# Patient Record
Sex: FEMALE | URBAN - METROPOLITAN AREA
[De-identification: names, ages, dates, MRNs, and addresses within clinical notes are randomized per-mention and may not be internally consistent; named-entity substitution may affect disease eponyms.]

---

## 2018-05-08 ENCOUNTER — HOSPITAL ENCOUNTER (INPATIENT)
Facility: HOSPITAL | Age: 23
LOS: 1 days | Discharge: LEFT BEFORE DISCHARGE | DRG: 816 | End: 2018-05-10
Attending: EMERGENCY MEDICINE | Admitting: INTERNAL MEDICINE
Payer: COMMERCIAL

## 2018-05-08 ENCOUNTER — APPOINTMENT (EMERGENCY)
Dept: CT IMAGING | Facility: HOSPITAL | Age: 23
DRG: 816 | End: 2018-05-08
Payer: COMMERCIAL

## 2018-05-08 ENCOUNTER — APPOINTMENT (EMERGENCY)
Dept: RADIOLOGY | Facility: HOSPITAL | Age: 23
DRG: 816 | End: 2018-05-08
Payer: COMMERCIAL

## 2018-05-08 DIAGNOSIS — F10.929 ACUTE ALCOHOL INTOXICATION (HCC): ICD-10-CM

## 2018-05-08 DIAGNOSIS — R41.82 ALTERED MENTAL STATUS: Primary | ICD-10-CM

## 2018-05-08 PROBLEM — E87.6 HYPOKALEMIA: Status: ACTIVE | Noted: 2018-05-08

## 2018-05-08 PROBLEM — R03.0 ELEVATED BLOOD PRESSURE READING: Status: ACTIVE | Noted: 2018-05-08

## 2018-05-08 PROBLEM — F19.10 POLYSUBSTANCE ABUSE (HCC): Status: ACTIVE | Noted: 2018-05-08

## 2018-05-08 PROBLEM — G93.40 ENCEPHALOPATHY ACUTE: Status: ACTIVE | Noted: 2018-05-08

## 2018-05-08 PROBLEM — I49.3 PVC (PREMATURE VENTRICULAR CONTRACTION): Status: ACTIVE | Noted: 2018-05-08

## 2018-05-08 LAB
ALBUMIN SERPL BCP-MCNC: 3.5 G/DL (ref 3.5–5)
ALP SERPL-CCNC: 96 U/L (ref 46–116)
ALT SERPL W P-5'-P-CCNC: 23 U/L (ref 12–78)
AMPHETAMINES SERPL QL SCN: NEGATIVE
ANION GAP SERPL CALCULATED.3IONS-SCNC: 12 MMOL/L (ref 4–13)
APAP SERPL-MCNC: <2 UG/ML (ref 10–30)
APTT PPP: 32 SECONDS (ref 23–35)
AST SERPL W P-5'-P-CCNC: 25 U/L (ref 5–45)
ATRIAL RATE: 104 BPM
BARBITURATES UR QL: NEGATIVE
BASOPHILS # BLD AUTO: 0.04 THOUSANDS/ΜL (ref 0–0.1)
BASOPHILS NFR BLD AUTO: 1 % (ref 0–1)
BENZODIAZ UR QL: POSITIVE
BILIRUB SERPL-MCNC: 0.2 MG/DL (ref 0.2–1)
BILIRUB UR QL STRIP: NEGATIVE
BUN SERPL-MCNC: 13 MG/DL (ref 5–25)
CALCIUM SERPL-MCNC: 8.7 MG/DL (ref 8.3–10.1)
CHLORIDE SERPL-SCNC: 101 MMOL/L (ref 100–108)
CLARITY UR: CLEAR
CO2 SERPL-SCNC: 26 MMOL/L (ref 21–32)
COCAINE UR QL: POSITIVE
COLOR UR: YELLOW
COLOR, POC: YELLOW
CREAT SERPL-MCNC: 1.2 MG/DL (ref 0.6–1.3)
EOSINOPHIL # BLD AUTO: 0.06 THOUSAND/ΜL (ref 0–0.61)
EOSINOPHIL NFR BLD AUTO: 1 % (ref 0–6)
ERYTHROCYTE [DISTWIDTH] IN BLOOD BY AUTOMATED COUNT: 13.6 % (ref 11.6–15.1)
ETHANOL SERPL-MCNC: 161 MG/DL (ref 0–3)
EXT PREG TEST URINE: NEGATIVE
GFR SERPL CREATININE-BSD FRML MDRD: 26 ML/MIN/1.73SQ M
GLUCOSE SERPL-MCNC: 79 MG/DL (ref 65–140)
GLUCOSE SERPL-MCNC: 87 MG/DL (ref 65–140)
GLUCOSE UR STRIP-MCNC: NEGATIVE MG/DL
HCT VFR BLD AUTO: 37.8 % (ref 34.8–46.1)
HGB BLD-MCNC: 12.5 G/DL (ref 11.5–15.4)
HGB UR QL STRIP.AUTO: NEGATIVE
INR PPP: 0.98 (ref 0.86–1.16)
KETONES UR STRIP-MCNC: NEGATIVE MG/DL
LEUKOCYTE ESTERASE UR QL STRIP: NEGATIVE
LYMPHOCYTES # BLD AUTO: 2.78 THOUSANDS/ΜL (ref 0.6–4.47)
LYMPHOCYTES NFR BLD AUTO: 39 % (ref 14–44)
MCH RBC QN AUTO: 29.9 PG (ref 26.8–34.3)
MCHC RBC AUTO-ENTMCNC: 33.1 G/DL (ref 31.4–37.4)
MCV RBC AUTO: 90 FL (ref 82–98)
METHADONE UR QL: NEGATIVE
MONOCYTES # BLD AUTO: 0.55 THOUSAND/ΜL (ref 0.17–1.22)
MONOCYTES NFR BLD AUTO: 8 % (ref 4–12)
NEUTROPHILS # BLD AUTO: 3.68 THOUSANDS/ΜL (ref 1.85–7.62)
NEUTS SEG NFR BLD AUTO: 51 % (ref 43–75)
NITRITE UR QL STRIP: NEGATIVE
NRBC BLD AUTO-RTO: 0 /100 WBCS
OPIATES UR QL SCN: POSITIVE
P AXIS: 39 DEGREES
PCP UR QL: POSITIVE
PH UR STRIP.AUTO: 5.5 [PH] (ref 4.5–8)
PLATELET # BLD AUTO: 408 THOUSANDS/UL (ref 149–390)
PMV BLD AUTO: 10.1 FL (ref 8.9–12.7)
POTASSIUM SERPL-SCNC: 3.4 MMOL/L (ref 3.5–5.3)
PR INTERVAL: 122 MS
PROT SERPL-MCNC: 8.1 G/DL (ref 6.4–8.2)
PROT UR STRIP-MCNC: NEGATIVE MG/DL
PROTHROMBIN TIME: 13 SECONDS (ref 12.1–14.4)
QRS AXIS: 55 DEGREES
QRSD INTERVAL: 104 MS
QT INTERVAL: 350 MS
QTC INTERVAL: 460 MS
RBC # BLD AUTO: 4.18 MILLION/UL (ref 3.81–5.12)
SALICYLATES SERPL-MCNC: <3 MG/DL (ref 3–20)
SODIUM SERPL-SCNC: 139 MMOL/L (ref 136–145)
SP GR UR STRIP.AUTO: <=1.005 (ref 1–1.03)
T WAVE AXIS: 30 DEGREES
THC UR QL: POSITIVE
TROPONIN I SERPL-MCNC: <0.02 NG/ML
UROBILINOGEN UR QL STRIP.AUTO: 0.2 E.U./DL
VENTRICULAR RATE: 104 BPM
WBC # BLD AUTO: 7.11 THOUSAND/UL (ref 4.31–10.16)

## 2018-05-08 PROCEDURE — 93010 ELECTROCARDIOGRAM REPORT: CPT | Performed by: INTERNAL MEDICINE

## 2018-05-08 PROCEDURE — 36415 COLL VENOUS BLD VENIPUNCTURE: CPT | Performed by: EMERGENCY MEDICINE

## 2018-05-08 PROCEDURE — 70450 CT HEAD/BRAIN W/O DYE: CPT

## 2018-05-08 PROCEDURE — 80329 ANALGESICS NON-OPIOID 1 OR 2: CPT | Performed by: EMERGENCY MEDICINE

## 2018-05-08 PROCEDURE — 71045 X-RAY EXAM CHEST 1 VIEW: CPT

## 2018-05-08 PROCEDURE — 85025 COMPLETE CBC W/AUTO DIFF WBC: CPT | Performed by: EMERGENCY MEDICINE

## 2018-05-08 PROCEDURE — 80307 DRUG TEST PRSMV CHEM ANLYZR: CPT | Performed by: EMERGENCY MEDICINE

## 2018-05-08 PROCEDURE — 81003 URINALYSIS AUTO W/O SCOPE: CPT

## 2018-05-08 PROCEDURE — 99285 EMERGENCY DEPT VISIT HI MDM: CPT

## 2018-05-08 PROCEDURE — 99220 PR INITIAL OBSERVATION CARE/DAY 70 MINUTES: CPT | Performed by: INTERNAL MEDICINE

## 2018-05-08 PROCEDURE — 81002 URINALYSIS NONAUTO W/O SCOPE: CPT | Performed by: EMERGENCY MEDICINE

## 2018-05-08 PROCEDURE — 80053 COMPREHEN METABOLIC PANEL: CPT | Performed by: EMERGENCY MEDICINE

## 2018-05-08 PROCEDURE — 85610 PROTHROMBIN TIME: CPT | Performed by: EMERGENCY MEDICINE

## 2018-05-08 PROCEDURE — 85730 THROMBOPLASTIN TIME PARTIAL: CPT | Performed by: EMERGENCY MEDICINE

## 2018-05-08 PROCEDURE — 93005 ELECTROCARDIOGRAM TRACING: CPT

## 2018-05-08 PROCEDURE — 84484 ASSAY OF TROPONIN QUANT: CPT | Performed by: EMERGENCY MEDICINE

## 2018-05-08 PROCEDURE — 96360 HYDRATION IV INFUSION INIT: CPT

## 2018-05-08 PROCEDURE — 96372 THER/PROPH/DIAG INJ SC/IM: CPT

## 2018-05-08 PROCEDURE — 80320 DRUG SCREEN QUANTALCOHOLS: CPT | Performed by: EMERGENCY MEDICINE

## 2018-05-08 PROCEDURE — 81025 URINE PREGNANCY TEST: CPT | Performed by: EMERGENCY MEDICINE

## 2018-05-08 PROCEDURE — 82948 REAGENT STRIP/BLOOD GLUCOSE: CPT

## 2018-05-08 RX ORDER — ZIPRASIDONE MESYLATE 20 MG/ML
INJECTION, POWDER, LYOPHILIZED, FOR SOLUTION INTRAMUSCULAR
Status: DISPENSED
Start: 2018-05-08 | End: 2018-05-09

## 2018-05-08 RX ORDER — MIDAZOLAM HYDROCHLORIDE 1 MG/ML
2 INJECTION INTRAMUSCULAR; INTRAVENOUS ONCE
Status: COMPLETED | OUTPATIENT
Start: 2018-05-08 | End: 2018-05-08

## 2018-05-08 RX ORDER — MIDAZOLAM HYDROCHLORIDE 1 MG/ML
4 INJECTION INTRAMUSCULAR; INTRAVENOUS ONCE
Status: DISCONTINUED | OUTPATIENT
Start: 2018-05-08 | End: 2018-05-08

## 2018-05-08 RX ORDER — MIDAZOLAM HYDROCHLORIDE 1 MG/ML
INJECTION INTRAMUSCULAR; INTRAVENOUS
Status: COMPLETED
Start: 2018-05-08 | End: 2018-05-08

## 2018-05-08 RX ORDER — ZIPRASIDONE MESYLATE 20 MG/ML
20 INJECTION, POWDER, LYOPHILIZED, FOR SOLUTION INTRAMUSCULAR ONCE
Status: COMPLETED | OUTPATIENT
Start: 2018-05-08 | End: 2018-05-08

## 2018-05-08 RX ADMIN — MIDAZOLAM 2 MG: 1 INJECTION INTRAMUSCULAR; INTRAVENOUS at 16:05

## 2018-05-08 RX ADMIN — WATER 10 ML: 1 INJECTION INTRAMUSCULAR; INTRAVENOUS; SUBCUTANEOUS at 16:08

## 2018-05-08 RX ADMIN — ZIPRASIDONE MESYLATE 20 MG: 20 INJECTION, POWDER, LYOPHILIZED, FOR SOLUTION INTRAMUSCULAR at 16:08

## 2018-05-08 RX ADMIN — MIDAZOLAM HYDROCHLORIDE 2 MG: 1 INJECTION INTRAMUSCULAR; INTRAVENOUS at 16:05

## 2018-05-08 RX ADMIN — MIDAZOLAM 2 MG: 1 INJECTION INTRAMUSCULAR; INTRAVENOUS at 16:18

## 2018-05-08 RX ADMIN — SODIUM CHLORIDE 1000 ML: 0.9 INJECTION, SOLUTION INTRAVENOUS at 16:45

## 2018-05-08 NOTE — ED NOTES
VO received for 2mg Versed R anterior thigh by Dr Sebastian Green, given at this time       Janessa Unger RN  05/08/18 5736

## 2018-05-08 NOTE — ED ATTENDING ATTESTATION
Kane Duke MD, saw and evaluated the patient  I have discussed the patient with the resident/non-physician practitioner and agree with the resident's/non-physician practitioner's findings, Plan of Care, and MDM as documented in the resident's/non-physician practitioner's note, except where noted  All available labs and Radiology studies were reviewed  At this point I agree with the current assessment done in the Emergency Department  I have conducted an independent evaluation of this patient a history and physical is as follows: Rosario Toribio adult female was found next to drug paraphernalia with altered mental status  Per EMS the patient was minimally responsive until they started an intravenous line  At that point the patient became agitated and verbally abusive to EMS  History is limited secondary to patient's agitation although she appears to admit to smoking synthetic marijuana  She was apparently on probation for at one point but no longer is  Otherwise the patient is either unable or unwilling to provide any history of present illness  Physical exam: GCS 14 (confusion) the patient was highly agitated and physically aggressive and unable to cooperate with a detailed neurologic exam   Head appears atraumatic  Sclerae nonicteric, conjunctiva appear normal  PERRL, moist mucous membranes  Heart is tachycardic but regular  Lungs are clear to auscultation bilaterally  The abdomen is soft and nondistended  Extremities are without edema or ecchymoses  Impression plan: Altered mental status, likely secondary to drug use  Unfortunately history is limited  Patient required IM sedatives in order to properly assess her  Will perform a broad workup including EKG, laboratory studies, and head CT  Will reassess for disposition      Critical Care Time  The patient presented with a condition in which there was a high probability of imminent or life-threatening deterioration, and critical care services (excluding separately billable procedures) totalled 30-74 minutes          Procedures

## 2018-05-08 NOTE — ED NOTES
20mg Geodon VO received from Dr Jayme Kay, given at this time by ROBERT Rubio RN L anterior thigh        Tano Garza RN  05/08/18 9343

## 2018-05-08 NOTE — ED NOTES
Pt soiled linens  Pt assisted onto bedpan, able to void large amount of urine        Cayla Baron RN  05/08/18 4226

## 2018-05-08 NOTE — ED NOTES
Violent restraints applied at this time due to pt uncooperative, violent with staff, unwilling to follow verbal direction        Mariana Hughes RN  05/08/18 4505

## 2018-05-08 NOTE — ED NOTES
Correction, pt provided urine sample via bed pan and did not need to be straight cathed       Abeba Jung RN  05/08/18 0158

## 2018-05-09 VITALS
SYSTOLIC BLOOD PRESSURE: 167 MMHG | RESPIRATION RATE: 18 BRPM | TEMPERATURE: 97.6 F | OXYGEN SATURATION: 98 % | HEART RATE: 60 BPM | WEIGHT: 192.24 LBS | DIASTOLIC BLOOD PRESSURE: 70 MMHG

## 2018-05-09 LAB
ALBUMIN SERPL BCP-MCNC: 3.2 G/DL (ref 3.5–5)
ALP SERPL-CCNC: 93 U/L (ref 46–116)
ALT SERPL W P-5'-P-CCNC: 22 U/L (ref 12–78)
ANION GAP SERPL CALCULATED.3IONS-SCNC: 10 MMOL/L (ref 4–13)
AST SERPL W P-5'-P-CCNC: 22 U/L (ref 5–45)
BILIRUB SERPL-MCNC: 0.37 MG/DL (ref 0.2–1)
BUN SERPL-MCNC: 9 MG/DL (ref 5–25)
CALCIUM SERPL-MCNC: 9 MG/DL (ref 8.3–10.1)
CHLORIDE SERPL-SCNC: 103 MMOL/L (ref 100–108)
CO2 SERPL-SCNC: 22 MMOL/L (ref 21–32)
CREAT SERPL-MCNC: 0.91 MG/DL (ref 0.6–1.3)
ERYTHROCYTE [DISTWIDTH] IN BLOOD BY AUTOMATED COUNT: 13.6 % (ref 11.6–15.1)
GFR SERPL CREATININE-BSD FRML MDRD: 37 ML/MIN/1.73SQ M
GLUCOSE P FAST SERPL-MCNC: 98 MG/DL (ref 65–99)
GLUCOSE SERPL-MCNC: 98 MG/DL (ref 65–140)
HCT VFR BLD AUTO: 40.4 % (ref 34.8–46.1)
HGB BLD-MCNC: 13.4 G/DL (ref 11.5–15.4)
MAGNESIUM SERPL-MCNC: 2.1 MG/DL (ref 1.6–2.6)
MCH RBC QN AUTO: 30.1 PG (ref 26.8–34.3)
MCHC RBC AUTO-ENTMCNC: 33.2 G/DL (ref 31.4–37.4)
MCV RBC AUTO: 91 FL (ref 82–98)
PHOSPHATE SERPL-MCNC: 3.3 MG/DL (ref 2.3–4.1)
PLATELET # BLD AUTO: 410 THOUSANDS/UL (ref 149–390)
PMV BLD AUTO: 10.2 FL (ref 8.9–12.7)
POTASSIUM SERPL-SCNC: 3.9 MMOL/L (ref 3.5–5.3)
PROT SERPL-MCNC: 8.1 G/DL (ref 6.4–8.2)
RBC # BLD AUTO: 4.45 MILLION/UL (ref 3.81–5.12)
SODIUM SERPL-SCNC: 135 MMOL/L (ref 136–145)
WBC # BLD AUTO: 7.71 THOUSAND/UL (ref 4.31–10.16)

## 2018-05-09 PROCEDURE — 84100 ASSAY OF PHOSPHORUS: CPT | Performed by: INTERNAL MEDICINE

## 2018-05-09 PROCEDURE — 85027 COMPLETE CBC AUTOMATED: CPT | Performed by: INTERNAL MEDICINE

## 2018-05-09 PROCEDURE — 80053 COMPREHEN METABOLIC PANEL: CPT | Performed by: INTERNAL MEDICINE

## 2018-05-09 PROCEDURE — 83735 ASSAY OF MAGNESIUM: CPT | Performed by: INTERNAL MEDICINE

## 2018-05-09 PROCEDURE — 99232 SBSQ HOSP IP/OBS MODERATE 35: CPT | Performed by: INTERNAL MEDICINE

## 2018-05-09 RX ORDER — SODIUM CHLORIDE 9 MG/ML
100 INJECTION, SOLUTION INTRAVENOUS CONTINUOUS
Status: DISCONTINUED | OUTPATIENT
Start: 2018-05-09 | End: 2018-05-10 | Stop reason: HOSPADM

## 2018-05-09 RX ORDER — HALOPERIDOL 5 MG/ML
1 INJECTION INTRAMUSCULAR EVERY 6 HOURS PRN
Status: DISCONTINUED | OUTPATIENT
Start: 2018-05-09 | End: 2018-05-10 | Stop reason: HOSPADM

## 2018-05-09 RX ORDER — ONDANSETRON 2 MG/ML
4 INJECTION INTRAMUSCULAR; INTRAVENOUS EVERY 6 HOURS PRN
Status: DISCONTINUED | OUTPATIENT
Start: 2018-05-09 | End: 2018-05-10 | Stop reason: HOSPADM

## 2018-05-09 RX ORDER — ACETAMINOPHEN 325 MG/1
650 TABLET ORAL EVERY 6 HOURS PRN
Status: DISCONTINUED | OUTPATIENT
Start: 2018-05-09 | End: 2018-05-10 | Stop reason: HOSPADM

## 2018-05-09 RX ORDER — CHLORDIAZEPOXIDE HYDROCHLORIDE 10 MG/1
10 CAPSULE, GELATIN COATED ORAL EVERY 8 HOURS SCHEDULED
Status: DISCONTINUED | OUTPATIENT
Start: 2018-05-09 | End: 2018-05-10 | Stop reason: HOSPADM

## 2018-05-09 RX ORDER — DEXTROSE, SODIUM CHLORIDE, AND POTASSIUM CHLORIDE 5; .45; .15 G/100ML; G/100ML; G/100ML
100 INJECTION INTRAVENOUS CONTINUOUS
Status: DISCONTINUED | OUTPATIENT
Start: 2018-05-09 | End: 2018-05-09

## 2018-05-09 RX ORDER — LORAZEPAM 2 MG/ML
1 INJECTION INTRAMUSCULAR EVERY 6 HOURS PRN
Status: DISCONTINUED | OUTPATIENT
Start: 2018-05-09 | End: 2018-05-10 | Stop reason: HOSPADM

## 2018-05-09 RX ORDER — METOPROLOL TARTRATE 5 MG/5ML
5 INJECTION INTRAVENOUS EVERY 6 HOURS PRN
Status: DISCONTINUED | OUTPATIENT
Start: 2018-05-09 | End: 2018-05-10 | Stop reason: HOSPADM

## 2018-05-09 RX ADMIN — CLONIDINE 0.1 MG: 0.1 PATCH TRANSDERMAL at 19:03

## 2018-05-09 RX ADMIN — DEXTROSE, SODIUM CHLORIDE, AND POTASSIUM CHLORIDE 100 ML/HR: 5; .45; .15 INJECTION INTRAVENOUS at 00:45

## 2018-05-09 RX ADMIN — ENOXAPARIN SODIUM 40 MG: 40 INJECTION SUBCUTANEOUS at 09:42

## 2018-05-09 RX ADMIN — ACETAMINOPHEN 650 MG: 325 TABLET, FILM COATED ORAL at 18:34

## 2018-05-09 RX ADMIN — SODIUM CHLORIDE 100 ML/HR: 0.9 INJECTION, SOLUTION INTRAVENOUS at 17:43

## 2018-05-09 RX ADMIN — CHLORDIAZEPOXIDE HYDROCHLORIDE 10 MG: 10 CAPSULE ORAL at 21:34

## 2018-05-09 RX ADMIN — SODIUM CHLORIDE 100 ML/HR: 0.9 INJECTION, SOLUTION INTRAVENOUS at 11:00

## 2018-05-09 NOTE — H&P
H&P- [employer] [place] [employer] [place] 5/8/1868, 80 y o  female MRN: [id number]    Unit/Bed#: ED 17 Encounter: [id number]    Primary Care Provider: No primary care provider on file  Date and time admitted to hospital: 5/8/2018  3:55 PM        Polysubstance abuse   Assessment & Plan    · Urine drug screen was positive for PCP, cocaine, opiates, benzodiazepines, THC and according to the ER doctor, patient also smokes K2, and was found to have an alcohol level that is high  · We do not have any information about this patient yet as patient came in agitated and was given doses of Geodon and Versed in the emergency room and presently patient is sedated  · Continue restraints in the meantime  · IV fluids  · Haloperidol p r n  Dorathy Infield · Watch out for any signs and symptoms of withdrawal from alcohol or any substance/illicit drug abuse  If patient develops alcohol withdrawal signs and symptoms, we will eventually use benzodiazepine  · Should get information from the patient once awake and coherent  · Needs counseling regarding polysubstance abuse; may need drug rehab  · Consider psychiatric consult  · Continued observation in the meantime ( 1:1)          * Encephalopathy acute   Assessment & Plan    · Toxic metabolic encephalopathy due to polysubstance ingestion/abuse  Please see details under polysubstance abuse section  · IV fluids  · Neuro checks  · Monitor electrolytes  · Control patient's agitation/violent behavior  · Continue restraints in the meantime  This has to be renewed every 4 hours  · Continued observation in the meantime ( 1:1)    · Haloperidol p r n  Dorathy Infield PVC (premature ventricular contraction)   Assessment & Plan    · Occasional PVCs  · Monitor electrolytes  Replace hypokalemia  · Telemetry  · Likely this is related to patient's polysubstance abuse and alcohol intake            Elevated blood pressure reading   Assessment & Plan    · This is likely due to patient's polysubstance abuse including alcohol intake  · IV blood pressure medication on as needed basis  · Monitor blood pressures  Hypokalemia   Assessment & Plan    · Replace potassium  · Monitor electrolytes  VTE Prophylaxis: Enoxaparin (Lovenox)  / sequential compression device it is important to note, that VTE risk assessment cannot be done at this point as patient is sedated and we cannot get any information from her yet  Needs to be assessed that tomorrow once patient is more awake  Code Status:  Since patient is sedated and we do not have any information about patient's family or even her name, by default, we put patient on level 1, full code for now  POLST: POLST form is not discussed and not completed at this time  Discussion with family:  We do not have any information about this patient yet at this point  We do not even know her name  Anticipated Length of Stay:  Patient will be admitted on an Observation basis with an anticipated length of stay of  less than 2 midnights  Justification for Hospital Stay:  Above findings and plans  Total Time for Visit, including Counseling / Coordination of Care: 1 hour  Greater than 50% of this total time spent on direct patient counseling and coordination of care  Chief Complaint:  Found unconscious    History of Present Illness:    Cassandra Tru[place] [employer] [place] is a 80 y o  female who was found unconscious on a ramp and was brought by EMS to [facility], emergency room  At this point in time, the do not have any information about this patient, not even her name, or any family members  Presently, patient is sedated and was given Versed and Geodon, due to an episode of significant agitation awhile ago  Thus, my HPI will be based on the sign out to me by the emergency room physician, as well as the emergency room doctor's notes  Patient came in due to an evaluation for Auchter mental status    Patient was found passed out on the ground with a pipe next to her  According to the ER notes, initial evaluation found that she was unresponsive, snoring, with respirations and that after IV access was obtained, patient woke up  However, patient developed significant delirium and agitation in the emergency room  Patient was creamy at the staff and not answering questions appropriately  Patient also did not follow commands and was aggressive with staff  Thus patient was placed on limb restraints and even required security to control her agitation  Patient then was given IV Versed and I am Geodon for chemical sedation  According to the ER notes, patient mentioned that she was drinking alcohol and smoking K2, though she did not answer any other questions  She mentioned that I am fine and I am not on probation    Vital signs on admission revealed that patient had elevated blood pressure with a systolic 470 and oxygen saturation at 90% on room air  Following sedation, patient was resting comfortably with pupils equal round and reactive with no focal neurologic deficits  GCS at that time was 14 prior to medication administration and was moving all extremities equally with full muscle strength  They did not find any nystagmus or any clonus or rigidity  Presently, when I saw the patient, patient is on limb restraints, still sedated  No response to questions or commands at this point  Review of Systems:    Review of Systems   Cannot do review of systems at this point as patient is presently sedated  Thus please see HPI  We will eventually to the review of systems once patient is more awake, likely tomorrow morning  Past Medical and Surgical History: We have no information yet about it this patient regarding her past medical and surgical history  Thus this will be done likely tomorrow, hopefully, patient is more awake to answer questions at that time        Meds/Allergies:    Medications and allergies cannot be determine yet at this point as patient is presently sedated  Thus this needs to be done when patient is more awake, hopefully by tomorrow  For the medication list, we do not have any information yet as we do not have any information about the patient yet at this point and patient is presently sedated  Thus this will be done once patient is awake and conversant and coherent  Allergies:  Please see above notes  Social History:     Patient is presently sedated and we do not have any information about this patient yet at this point  However, patient likely has polysubstance abuse  Family History:    We do not have any information yet about this patient including family health history  Physical Exam:     Vitals:   Blood Pressure: 155/87 (05/08/18 1953)  Pulse: 87 (05/08/18 1953)  Temperature: 98 2 °F (36 8 °C) (05/08/18 2034)  Temp Source: Temporal (05/08/18 2034)  Respirations: 16 (05/08/18 1953)  Weight - Scale: 87 2 kg (192 lb 3 9 oz) (05/08/18 1621)  SpO2: 100 % (05/08/18 1953)    Physical Exam   Constitutional:   Patient is presently sedated  With limb restraints on patient's right arm and left leg  HENT:   Head: Normocephalic and atraumatic  Eyes: Conjunctivae are normal  Pupils are equal, round, and reactive to light  Right eye exhibits no discharge  Left eye exhibits no discharge  No scleral icterus  Neck: Neck supple  No JVD present  No tracheal deviation present  Cardiovascular: Normal rate and regular rhythm  Exam reveals no gallop and no friction rub  No murmur heard  Regular rhythm with occasional premature beats  Pulmonary/Chest: Effort normal and breath sounds normal  No stridor  No respiratory distress  She has no wheezes  She has no rales  Abdominal: Soft  Bowel sounds are normal  She exhibits no distension  There is no tenderness  There is no rebound and no guarding  Musculoskeletal: She exhibits no edema, tenderness or deformity     Neurological:   Patient is presently sedated  Does not answer questions  Does not wake up  With normal respirations  Skin: Skin is warm  No rash noted  She is not diaphoretic  No erythema  No pallor  Positive for tattoos  Psychiatric:   Psychiatric evaluation cannot be done yet as patient is presently sedated  Additional Data:     Lab Results: I have personally reviewed pertinent reports  Results from last 7 days  Lab Units 05/08/18  1643   WBC Thousand/uL 7 11   HEMOGLOBIN g/dL 12 5   HEMATOCRIT % 37 8   PLATELETS Thousands/uL 408*   NEUTROS PCT % 51   LYMPHS PCT % 39   MONOS PCT % 8   EOS PCT % 1       Results from last 7 days  Lab Units 05/08/18  1644   SODIUM mmol/L 139   POTASSIUM mmol/L 3 4*   CHLORIDE mmol/L 101   CO2 mmol/L 26   BUN mg/dL 13   CREATININE mg/dL 1 20   CALCIUM mg/dL 8 7   TOTAL PROTEIN g/dL 8 1   BILIRUBIN TOTAL mg/dL 0 20   ALK PHOS U/L 96   ALT U/L 23   AST U/L 25   GLUCOSE RANDOM mg/dL 79       Results from last 7 days  Lab Units 05/08/18  1654   INR  0 98       Results from last 7 days  Lab Units 05/08/18  1704   POC GLUCOSE mg/dl 87           Imaging: I have personally reviewed pertinent reports  CT head without contrast   Final Result by Manuel Bourgeois MD (05/08 1820)      No acute intracranial abnormality  Workstation performed: [id number]         XR chest portable   Final Result by Leonard Burns DO (05/08 1745)      Low lung volumes  No acute cardiopulmonary disease  Workstation performed: [id number]             EKG, Pathology, and Other Studies Reviewed on Admission:   · EKG:  Sinus rhythm with occasional PVCs  Allscripts / Epic Records Reviewed: Yes     ** Please Note: This note has been constructed using a voice recognition system   **

## 2018-05-09 NOTE — PLAN OF CARE
DISCHARGE PLANNING     Discharge to home or other facility with appropriate resources Not Progressing        INFECTION - ADULT     Absence or prevention of progression during hospitalization Not Progressing     Absence of fever/infection during neutropenic period Not Progressing        Knowledge Deficit     Patient/family/caregiver demonstrates understanding of disease process, treatment plan, medications, and discharge instructions Not Progressing        PAIN - ADULT     Verbalizes/displays adequate comfort level or baseline comfort level Not Progressing        Potential for Falls     Patient will remain free of falls Not Progressing        Prexisting or High Potential for Compromised Skin Integrity     Skin integrity is maintained or improved Not Progressing        SAFETY ADULT     Maintain or return to baseline ADL function Not Progressing     Maintain or return mobility status to optimal level Not Progressing     Patient will remain free of falls Not Progressing

## 2018-05-09 NOTE — ASSESSMENT & PLAN NOTE
· This is likely due to patient's polysubstance abuse including alcohol intake  · IV blood pressure medication on as needed basis  · Monitor blood pressures

## 2018-05-09 NOTE — PROGRESS NOTES
Pt up ambulating to bathroom  Patient reports name is [place], [place] [birth year]  Pt is still sedated, and unable/refuses to answer additional questions

## 2018-05-09 NOTE — ASSESSMENT & PLAN NOTE
· Occasional PVCs  · Monitor electrolytes  Replace hypokalemia  · Telemetry  · Likely this is related to patient's polysubstance abuse and alcohol intake

## 2018-05-09 NOTE — ED NOTES
SLIM admitting provider reporting he would reorder violent restraints for pt due to inability to follow direction and interference with medical treatment        Janessa Unger RN  05/08/18 2020

## 2018-05-09 NOTE — PLAN OF CARE
Knowledge Deficit     Patient/family/caregiver demonstrates understanding of disease process, treatment plan, medications, and discharge instructions Not Progressing          Prexisting or High Potential for Compromised Skin Integrity     Skin integrity is maintained or improved Progressing        SAFETY ADULT     Maintain or return to baseline ADL function Progressing     Maintain or return mobility status to optimal level Progressing     Patient will remain free of falls Progressing

## 2018-05-09 NOTE — ASSESSMENT & PLAN NOTE
· Urine drug screen was positive for PCP, cocaine, opiates, benzodiazepines, THC and according to the ER doctor, patient also smokes K2, and was found to have an alcohol level that is high  · We do not have any information about this patient yet as patient came in agitated and was given doses of Geodon and Versed in the emergency room and presently patient is sedated  · Continue restraints in the meantime  · IV fluids  · Haloperidol p r n  Marcine Grow · Watch out for any signs and symptoms of withdrawal from alcohol or any substance/illicit drug abuse  If patient develops alcohol withdrawal signs and symptoms, we will eventually use benzodiazepine  · Should get information from the patient once awake and coherent  · Needs counseling regarding polysubstance abuse; may need drug rehab  · Consider psychiatric consult    · Continued observation in the meantime ( 1:1)

## 2018-05-09 NOTE — PROGRESS NOTES
Pt received on unit in restraints due to earlier episode of agitation in the ED  Restraints immediately removed upon arrival to this unit as pt was observed to be sedated and less agitated at this time  Pt remained disoriented and uncooperative initially refusing any assessment or interventions by this writer  Pt later consented to allow this writer to assess her but would not allow RN to assess her back or move her  Presents as restless and irritable/agitated when touched or aroused from sleep  Refused application of SCDs  Pt is a poor historian and minimally verbal with incoherent speech at times  Unable/unwilling to answer majority of admission assessment questions  Stated only that her name was "Sophia Amezcua " Will continue to monitor and endorse to day shift RN

## 2018-05-09 NOTE — ASSESSMENT & PLAN NOTE
· Toxic metabolic encephalopathy due to polysubstance ingestion/abuse  Please see details under polysubstance abuse section  · IV fluids  · Neuro checks  · Monitor electrolytes  · Control patient's agitation/violent behavior  · Continue restraints in the meantime  This has to be renewed every 4 hours  · Continued observation in the meantime ( 1:1)    · Haloperidol brittney Randolph

## 2018-05-09 NOTE — CASE MANAGEMENT
Initial Clinical Review    Admission: Date/Time/Statement:    OBS  ORDER     5/8  @   1939    IP   ORDER  ENTERED   5/9  @   1012  :  Given the patient is unable to communicate meaningfully, her identity is unknown, I do believe that the patient would require another midnight for cardiopulmonary monitoring  Will change the patient class status to inpatient  Discussed with nursing team and case management    ED: Date/Time/Mode of Arrival:   ED Arrival Information     Expected Arrival Acuity Means of Arrival Escorted By Service Admission Type    - 5/8/2018 15:52 [facility] [id number] Complaint    OVERDOSE          Chief Complaint:   Chief Complaint   Patient presents with    Overdose - Accidental     Pt found unconscious on a ramp, brought in by EMS, pt responds to painful stimuli, pt found with pipe next to her, pt snoring respirations, after IV attempt, pt uncooperative  History of Illness:   Cassandra Trujillo Two [place] is a 80 y o  female who was found unconscious on a ramp and was brought by EMS to [facility], emergency room  At this point in time, the do not have any information about this patient, not even her name, or any family members  Presently, patient is sedated and was given Versed and Geodon, due to an episode of significant agitation awhile ago  Thus, my HPI will be based on the sign out to me by the emergency room physician, as well as the emergency room doctor's notes  Patient came in due to an evaluation for Auchter mental status  Patient was found passed out on the ground with a pipe next to her  According to the ER notes, initial evaluation found that she was unresponsive, snoring, with respirations and that after IV access was obtained, patient woke up  However, patient developed significant delirium and agitation in the emergency room    Patient was creamy at the staff and not answering questions appropriately  Patient also did not follow commands and was aggressive with staff  Thus patient was placed on limb restraints and even required security to control her agitation  Patient then was given IV Versed and I am Geodon for chemical sedation  According to the ER notes, patient mentioned that she was drinking alcohol and smoking K2, though she did not answer any other questions  She mentioned that I am fine and I am not on probation    Vital signs on admission revealed that patient had elevated blood pressure with a systolic 198 and oxygen saturation at 90% on room air  Following sedation, patient was resting comfortably with pupils equal round and reactive with no focal neurologic deficits  GCS at that time was 14 prior to medication administration and was moving all extremities equally with full muscle strength  They did not find any nystagmus or any clonus or rigidity  Presently, when I saw the patient, patient is on limb restraints, still sedated    No response to questions or commands at this point        ED Vital Signs:   ED Triage Vitals   Temperature Pulse Respirations Blood Pressure SpO2   05/08/18 2034 05/08/18 1619 05/08/18 1612 05/08/18 1612 05/08/18 1612   98 2 °F (36 8 °C) (!) 120 (!) 26 158/96 98 %      Temp Source Heart Rate Source Patient Position - Orthostatic VS BP Location FiO2 (%)   05/08/18 2034 05/08/18 1619 05/08/18 1612 05/08/18 1612 --   Temporal Monitor Lying Right arm       Pain Score       05/08/18 1852       No Pain        Wt Readings from Last 1 Encounters:   05/08/18 87 2 kg (192 lb 3 9 oz)       Vital Signs (abnormal):    above    Abnormal Labs/Diagnostic Test Results:    UDS   +  benzos   +  Cocaine    +  THC    +  pCP    + opiate  K  3 4  BAL  161  Ct  Head;  No acute intracranial abnormality  CXR:   NAD    ED Treatment:   Medication Administration from 05/08/2018 1551 to 05/08/2018 2044       Date/Time Order Dose Route Action Action by Comments     05/08/2018 1608 sterile water injection **AcuDose Override Pull** 10 mL  Given Mono Pritchard RN      05/08/2018 2022 midazolam (VERSED) injection 4 mg   Intramuscular Canceled Entry Ovidio Bui RN canceled by provider     05/08/2018 1608 ziprasidone (GEODON) IM injection 20 mg 20 mg Intramuscular Given Mono Pritchard RN      05/08/2018 1605 midazolam (VERSED) injection 2 mg 2 mg Intramuscular Given Ovidio Bui RN      05/08/2018 1618 midazolam (VERSED) injection 2 mg 2 mg Intramuscular Given Aileen Palacios RN      05/08/2018 1747 sodium chloride 0 9 % bolus 1,000 mL 0 mL Intravenous Stopped Aileen Palacios RN      05/08/2018 1645 sodium chloride 0 9 % bolus 1,000 mL 1,000 mL Intravenous New Bag Aileen Palacios RN           Past Medical/Surgical History: Active Ambulatory Problems     Diagnosis Date Noted    No Active Ambulatory Problems     Resolved Ambulatory Problems     Diagnosis Date Noted    No Resolved Ambulatory Problems     No Additional Past Medical History       Admitting Diagnosis: Altered mental status [R41 82]  Acute alcohol intoxication ([facility] [address] ) [F10 929]    Age/Sex: 80 y o  female    Assessment/Plan:     Polysubstance abuse   Assessment & Plan     · Urine drug screen was positive for PCP, cocaine, opiates, benzodiazepines, THC and according to the ER doctor, patient also smokes K2, and was found to have an alcohol level that is high  · We do not have any information about this patient yet as patient came in agitated and was given doses of Geodon and Versed in the emergency room and presently patient is sedated  · Continue restraints in the meantime  · IV fluids  · Haloperidol p r n  Elsa Shah · Watch out for any signs and symptoms of withdrawal from alcohol or any substance/illicit drug abuse  If patient develops alcohol withdrawal signs and symptoms, we will eventually use benzodiazepine  · Should get information from the patient once awake and coherent    · Needs counseling regarding polysubstance abuse; may need drug rehab  · Consider psychiatric consult  · Continued observation in the meantime ( 1:1)       Encephalopathy acute   Assessment & Plan     · Toxic metabolic encephalopathy due to polysubstance ingestion/abuse  Please see details under polysubstance abuse section  · IV fluids  · Neuro checks  · Monitor electrolytes  · Control patient's agitation/violent behavior  · Continue restraints in the meantime  This has to be renewed every 4 hours  · Continued observation in the meantime ( 1:1)    · Haloperidol p r n             PVC (premature ventricular contraction)   Assessment & Plan     · Occasional PVCs  · Monitor electrolytes  Replace hypokalemia  · Telemetry  · Likely this is related to patient's polysubstance abuse and alcohol intake         Elevated blood pressure reading   Assessment & Plan     · This is likely due to patient's polysubstance abuse including alcohol intake  · IV blood pressure medication on as needed basis  · Monitor blood pressures           Hypokalemia   Assessment & Plan     · Replace potassium  · Monitor electrolytes       Anticipated Length of Stay:  Patient will be admitted on an Observation basis with an anticipated length of stay of  less than 2 midnights  Justification for Hospital Stay:  Above findings and plans          Admission Orders:   OBS  ORDER   5/8  @  1939  Scheduled Meds:   Current Facility-Administered Medications:  acetaminophen 650 mg Oral Q6H PRN Shelly Zaragoza MD    dextrose 5 % and sodium chloride 0 45 % with KCl 20 mEq/L 100 mL/hr Intravenous Continuous Shelly Zaragoza MD Last Rate: 100 mL/hr (05/09/18 0045)   enoxaparin 40 mg Subcutaneous Daily Shelly Zaragoza MD    haloperidol lactate 1 mg Intramuscular Q6H PRN Shelly Zaragoza MD    metoprolol 5 mg Intravenous Q6H PRN Shelly Zaragoza MD    ondansetron 4 mg Intravenous Q6H PRN Shelly Zaragoza MD      Continuous Infusions:   dextrose 5 % and sodium chloride 0 45 % with KCl 20 mEq/L 100 mL/hr Last Rate: 100 mL/hr (05/09/18 0045)     PRN Meds:   acetaminophen    haloperidol lactate    metoprolol    ondansetron    O2  2L  1:1  Observation  Neuro  Checks  q 4 hrs    Thank you,  [address] [facility] in the [facility] by Blas Monterroso for 2017  Network Utilization Review Department  Phone: [phone]; Fax [phone]  ATTENTION: The Network Utilization Review Department is now centralized for our 7 Facilities  Make a note that we have a new phone and fax numbers for our Department  Please call with any questions or concerns to [phone] and carefully follow the prompts so that you are directed to the right person  All voicemails are confidential  Fax any determinations, approvals, denials, and requests for initial or continue stay review clinical to [phone]  Due to HIGH CALL volume, it would be easier if you could please send faxed requests to expedite your requests and in part, help us provide discharge notifications faster

## 2018-05-09 NOTE — SOCIAL WORK
RN stated pt provided her name to be Waleska Monteirojaxdebra   89  Pt unable to provide any other information  Will continue to follow pt and meet with her when her mentation is better

## 2018-05-09 NOTE — PROGRESS NOTES
Progress Note - [facility] / Hospitalists  Cassandra Trujillo [place] [de-identified] 80 y o  female MRN: [id number]  Unit/Bed#: E5 -01 Encounter: [id number]      ASSESSMENT AND PLAN:  1  Acute encephalopathy secondary to polysubstance abuse-the patient's drug screen was positive for cocaine opiates benzodiazepines THC and PCP  She also had alcohol intoxication  The patient is arousable but nonsensical at present  According to ER reports the patient smoked K2 and was found down on a ramp with drug paraphernalia  No signs of infection at present  · Continue 1 on 1, finger foods only when alert enough to eat  · Continue IVF and Haldol p r n  · Will need a psychiatric consult when more alert, discussed with case management and nursing    2  Elevated blood pressure reading-the patient's blood pressures been ranging from systolic 357I to 113X  Heart rate variable 70s to 120  Metoprolol p r n     3   Occasional PVCs likely secondary to polysubstance abuse and alcohol intake  4   Hypokalemia-repleted    5  Cough-patient has a wet cough when aroused  Chest x-ray shows diminished lung volumes but no consolidation  Afebrile with a normal white blood cell count  Monitor respiratory status      VTE Prophylaxis: Enoxaparin (Lovenox)    Dispo:  Given the patient is unable to communicate meaningfully, her identity is unknown, I do believe that the patient would require another midnight for cardiopulmonary monitoring  Will change the patient class status to inpatient  Discussed with nursing team and case management  ______________________________________________________________________    SUBJECTIVE:   Patient seen and examined  Arousable when talking to the patient she tosses and turns  She makes nonsensical speech  She coughs vigorously    She is allowed me to briefly assess her, however she did toss and turn and flail a few times which limited my ability to perform a thorough examination    OBJECTIVE: Principal Problem:    Encephalopathy acute  Active Problems:    Polysubstance abuse    Hypokalemia    Elevated blood pressure reading    PVC (premature ventricular contraction)      Vitals:   HR:  [] 89  Resp:  [16-26] 20  BP: (121-166)/() 166/95  SpO2:  [95 %-100 %] 100 %  Temp (24hrs), Av 4 °F (36 9 °C), Min:97 9 °F (36 6 °C), Max:99 °F (37 2 °C)  Current: Temperature: 98 5 °F (36 9 °C)    Intake/Output Summary (Last 24 hours) at 18 1012  Last data filed at 18 1747   Gross per 24 hour   Intake             1000 ml   Output                0 ml   Net             1000 ml       Physical Exam:     General Appearance:    Somnolent but arousable   Head:   Normocephalic, without obvious abnormality, atraumatic   Eyes:    Conjunctiva/corneas clear, EOM's intact       Neck:   Supple, no adenopathy, no JVD   Back:     Symmetric, no curvature, ROM normal, no CVA tenderness   Lungs:     Course overall with wet cough   Heart:  Regular rate and rhythm, S1 and S2 normal   Abdomen:     Soft, non-tender, bowel sounds decreased   Extremities:   Extremities normal, atraumatic, no cyanosis or edema   Psych: Somnolent   Neurologic:   CNII-XII intact   Moves all extremities     Lab, Imaging and other studies:      Results from last 7 days  Lab Units 18  0643 18  1654   WBC Thousand/uL 7 71  --    HEMOGLOBIN g/dL 13 4  --    HEMATOCRIT % 40 4  --    PLATELETS Thousands/uL 410*  --    INR   --  0 98       Results from last 7 days  Lab Units 18  0643   SODIUM mmol/L 135*   POTASSIUM mmol/L 3 9   CHLORIDE mmol/L 103   CO2 mmol/L 22   BUN mg/dL 9   CREATININE mg/dL 0 91   CALCIUM mg/dL 9 0   TOTAL PROTEIN g/dL 8 1   BILIRUBIN TOTAL mg/dL 0 37   ALK PHOS U/L 93   ALT U/L 22   AST U/L 22   GLUCOSE RANDOM mg/dL 98       Results from last 7 days  Lab Units 18  1644   TROPONIN I ng/mL <0 02     No results found for: Ne Armor, WOUNDCULT, SPUTUMCULTUR    Scheduled Meds:    Current Facility-Administered Medications:  acetaminophen 650 mg Oral Q6H PRN Shelly Zaragoza MD   enoxaparin 40 mg Subcutaneous Daily Shelly Zaragoza MD   haloperidol lactate 1 mg Intramuscular Q6H PRN Shelly Zaragoza MD   metoprolol 5 mg Intravenous Q6H PRN Shelly Zaragoza MD   ondansetron 4 mg Intravenous Q6H PRN Shelly Zaragoza MD   sodium chloride 100 mL/hr Intravenous Continuous Laverne Struass PA-C       Continuous Infusions:    sodium chloride 100 mL/hr       PRN Meds:    acetaminophen    haloperidol lactate    metoprolol    ondansetron      Counseling / Coordination of Care  Total floor / unit time spent today 30 minutes  minutes  Greater than 50% of total time was spent with the patient and / or family counseling and / or coordination of care        This note has been constructed using a voice recognition system

## 2018-05-09 NOTE — PROGRESS NOTES
Pt is being monitored on telemetry  Has consistently had frequent PVCs throughout night  At times monitor shows bigeminy and trigeminy  Remains on continuous fluids with electrolytes as per order  Dr Knapp Divers contacted and made aware of all of the above  No new orders given  Will continue to monitor and endorse to day shift RN

## 2018-05-10 NOTE — PROGRESS NOTES
Supervisor at bedside speaking with pt  She is alert and oriented times 3  Still refusing to stay until morning  IV taken out  Supervisor asked if she would stay until the Dr Blanche Jarquin to see her  She refused, and said she was leaving now

## 2018-05-10 NOTE — PROGRESS NOTES
Call placed to [facility] admitting  He said to just let her go  She did sign the AMA paper, and the aid walked her down to the exit

## 2018-05-10 NOTE — PROGRESS NOTES
The pt is requesting to leave for home now  Unable to change her mind  Call placed to supervisor, he will come to speak with her

## 2018-05-16 NOTE — CASE MANAGEMENT
Yuridia Best, RN Registered Nurse Addendum   Case Management Date of Service: 5/9/2018  9:40 AM         []Hide copied text  Initial Clinical Review     Admission: Date/Time/Statement:    OBS  ORDER     5/8  @   [address]  ENTERED   5/9  @   1012  : Neisha Back the patient is unable to communicate meaningfully, her identity is unknown, I do believe that the patient would require another midnight for cardiopulmonary monitoring   Will change the patient class status to inpatient   Discussed with nursing team and case management     ED: Date/Time/Mode of Arrival:             ED Arrival Information      Expected Arrival 70 [address] Kanika Lehman of Arrival Escorted By Service Admission Type     - 5/8/2018 15:52 [facility] General Medicine Emergency     Arrival Complaint     OVERDOSE             Chief Complaint:        Chief Complaint   Patient presents with    Overdose - Accidental       Pt found unconscious on a ramp, brought in by EMS, pt responds to painful stimuli, pt found with pipe next to her, pt snoring respirations, after IV attempt, pt uncooperative          History of Illness:   Cassandra Trujillo Two Mona And Five is a 89 y o  female who was found unconscious on a ramp and was brought by EMS to [facility], emergency room   At this point in time, the do not have any information about this patient, not even her name, or any family members   Presently, patient is sedated and was given Versed and Geodon, due to an episode of significant agitation awhile ago   Thus, my HPI will be based on the sign out to me by the emergency room physician, as well as the emergency room doctor's notes  Brent Adjutant came in due to an evaluation for Auchter mental status   Patient was found passed out on the ground with a pipe next to her   According to the ER notes, initial evaluation found that she was unresponsive, snoring, with respirations and that after IV access was obtained, patient woke up  Sherl Boogie, patient developed significant delirium and agitation in the emergency room   Patient was creamy at the staff and not answering questions appropriately   Patient also did not follow commands and was aggressive with staff   Thus patient was placed on limb restraints and even required security to control her agitation   Patient then was given IV Versed and I am Geodon for chemical sedation   According to the ER notes, patient mentioned that she was drinking alcohol and smoking K2, though she did not answer any other questions   She mentioned that I am fine and I am not on probation    Vital signs on admission revealed that patient had elevated blood pressure with a systolic 910 and oxygen saturation at 90% on room air   Following sedation, patient was resting comfortably with pupils equal round and reactive with no focal neurologic deficits   GCS at that time was 14 prior to medication administration and was moving all extremities equally with full muscle strength   They did not find any nystagmus or any clonus or rigidity   Presently, when I saw the patient, patient is on limb restraints, still sedated   No response to questions or commands at this point         ED Vital Signs:            ED Triage Vitals   Temperature Pulse Respirations Blood Pressure SpO2   05/08/18 2034 05/08/18 1619 05/08/18 1612 05/08/18 1612 05/08/18 1612   98 2 °F (36 8 °C) (!) 120 (!) 26 158/96 98 %       Temp Source Heart Rate Source Patient Position - Orthostatic VS BP Location FiO2 (%)   05/08/18 2034 05/08/18 1619 05/08/18 1612 05/08/18 1612 --   Temporal Monitor Lying Right arm         Pain Score           05/08/18 1852           No Pain                Wt Readings from Last 1 Encounters:   05/08/18 87 2 kg (192 lb 3 9 oz)         Vital Signs (abnormal):    above     Abnormal Labs/Diagnostic Test Results:    UDS   +  benzos   +  Cocaine    +  THC    +  pCP    + opiate  K  3 4  BAL  161  Ct  Head;  No acute intracranial abnormality  CXR:   NAD     ED Treatment:              Medication Administration from 05/08/2018 1551 to 05/08/2018 2044        Date/Time Order Dose Route Action Action by Comments       05/08/2018 1608 sterile water injection **AcuDose Override Pull** 10 mL   Given Peg Sal RN         05/08/2018 2022 midazolam (VERSED) injection 4 mg   Intramuscular Canceled Entry Analia Red RN canceled by provider       05/08/2018 1608 ziprasidone (GEODON) IM injection 20 mg 20 mg Intramuscular Given Peg Sal RN         05/08/2018 1605 midazolam (VERSED) injection 2 mg 2 mg Intramuscular Given Analia Red RN         05/08/2018 1618 midazolam (VERSED) injection 2 mg 2 mg Intramuscular Given Sherri Barrios RN         05/08/2018 1747 sodium chloride 0 9 % bolus 1,000 mL 0 mL Intravenous Stopped Sherri Barrios RN         05/08/2018 1645 sodium chloride 0 9 % bolus 1,000 mL 1,000 mL Intravenous New Bag Sherri Barrios RN               Past Medical/Surgical History: Active Ambulatory Problems     Diagnosis Date Noted    No Active Ambulatory Problems           Resolved Ambulatory Problems     Diagnosis Date Noted    No Resolved Ambulatory Problems      No Additional Past Medical History         Admitting Diagnosis: Altered mental status [R41 82]  Acute alcohol intoxication ([facility] [address] ) [F10 929]     Age/Sex: 80 y o  female     Assessment/Plan:         Polysubstance abuse   Assessment & Plan     · Urine drug screen was positive for PCP, cocaine, opiates, benzodiazepines, THC and according to the ER doctor, patient also smokes K2, and was found to have an alcohol level that is high  · We do not have any information about this patient yet as patient came in agitated and was given doses of Geodon and Versed in the emergency room and presently patient is sedated  · Continue restraints in the meantime  · IV fluids  · Haloperidol p r n  Orbie Mattock   · Watch out for any signs and symptoms of withdrawal from alcohol or any substance/illicit drug abuse   If patient develops alcohol withdrawal signs and symptoms, we will eventually use benzodiazepine  · Should get information from the patient once awake and coherent  · Needs counseling regarding polysubstance abuse; may need drug rehab  · Consider psychiatric consult  · Continued observation in the meantime ( 1:1)            Encephalopathy acute   Assessment & Plan     · Toxic metabolic encephalopathy due to polysubstance ingestion/abuse   Please see details under polysubstance abuse section  · IV fluids  · Neuro checks  · Monitor electrolytes  · Control patient's agitation/violent behavior  · Continue restraints in the meantime   This has to be renewed every 4 hours  · Continued observation in the meantime ( 1:1)    · Haloperidol p r n             PVC (premature ventricular contraction)   Assessment & Plan     · Occasional PVCs  · Monitor electrolytes   Replace hypokalemia  · Telemetry  · Likely this is related to patient's polysubstance abuse and alcohol intake              Elevated blood pressure reading   Assessment & Plan     · This is likely due to patient's polysubstance abuse including alcohol intake  · IV blood pressure medication on as needed basis  · Monitor blood pressures           Hypokalemia   Assessment & Plan     · Replace potassium    · Monitor electrolytes       Anticipated Length of Stay: Donnie Howell will be admitted on an Observation basis with an anticipated length of stay of  less than 2 midnights    Justification for Hospital Stay:  Above findings and plans            Admission Orders:   OBS  ORDER   5/8  @  1939  Scheduled Meds:   Current Facility-Administered Medications:  acetaminophen 650 mg Oral Q6H PRN Shelly Zaragoza MD     dextrose 5 % and sodium chloride 0 45 % with KCl 20 mEq/L 100 mL/hr Intravenous Continuous Shelly Zaragoza MD Last Rate: 100 mL/hr (05/09/18 0045)   enoxaparin 40 mg Subcutaneous Daily Shelly Zaragoza MD   haloperidol lactate 1 mg Intramuscular Q6H PRN Shelly Zaragoza MD     metoprolol 5 mg Intravenous Q6H PRN Shelly Zaragoza MD     ondansetron 4 mg Intravenous Q6H PRN Shelly Zaragoza MD        Continuous Infusions:   dextrose 5 % and sodium chloride 0 45 % with KCl 20 mEq/L 100 mL/hr Last Rate: 100 mL/hr (05/09/18 0045)      PRN Meds:   acetaminophen    haloperidol lactate    metoprolol    ondansetron     O2  2L  1:1  Observation  Neuro  Checks  q 4 hrs     Thank you,  [address] [place] [address] in the [employer] by [employer] Monterroso for 2017  Network Utilization Review Department  Phone: [phone]; Fax [phone]  ATTENTION: The Network Utilization Review Department is now centralized for our 7 Facilities  Make a note that we have a new phone and fax numbers for our Department  Please call with any questions or concerns to [phone] and carefully follow the prompts so that you are directed to the right person  All voicemails are confidential  Fax any determinations, approvals, denials, and requests for initial or continue stay review clinical to [phone]  Due to HIGH CALL volume, it would be easier if you could please send faxed requests to expedite your requests and in part, help us provide discharge notifications faster            Notification of Discharge  This is a Notification of Discharge from our facility [address]  Please be advised that this patient has been discharge from our facility  Below you will find the admission and discharge date and time including the patients disposition  PRESENTATION DATE: 5/8/2018  3:55 PM  IP ADMISSION DATE: 5/9/18 1012  DISCHARGE DATE: 5/10/2018  4:00 AM  DISPOSITION: Left against medical advice or discontinued care    [address] [place] [address] in the [employer] [facility] Monterroso for 2017  Network Utilization Review Department  Phone: [phone];  Fax [phone]  ATTENTION: The Network Utilization Review Department is now centralized for our 7 Facilities  Make a note that we have a new phone and fax numbers for our Department  Please call with any questions or concerns to [phone] and carefully follow the prompts so that you are directed to the right person  All voicemails are confidential  Fax any determinations, approvals, denials, and requests for initial or continue stay review clinical to [phone]  Due to HIGH CALL volume, it would be easier if you could please send faxed requests to expedite your requests and in part, help us provide discharge notifications faster

## 2018-05-28 ENCOUNTER — HOSPITAL ENCOUNTER (EMERGENCY)
Facility: HOSPITAL | Age: 23
Discharge: HOME | End: 2018-05-28
Attending: EMERGENCY MEDICINE | Admitting: EMERGENCY MEDICINE
Payer: COMMERCIAL

## 2018-05-28 VITALS
OXYGEN SATURATION: 98 % | TEMPERATURE: 97.8 F | HEART RATE: 78 BPM | RESPIRATION RATE: 18 BRPM | WEIGHT: 173.28 LBS | SYSTOLIC BLOOD PRESSURE: 126 MMHG | DIASTOLIC BLOOD PRESSURE: 81 MMHG

## 2018-05-28 DIAGNOSIS — F19.10 POLYSUBSTANCE ABUSE (HCC): Primary | ICD-10-CM

## 2018-05-28 PROCEDURE — 99283 EMERGENCY DEPT VISIT LOW MDM: CPT

## 2018-05-28 NOTE — ED PROVIDER NOTES
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[place], admitted to using crack mulitple times tongiht, last use about an hour ago  police brought in here for psych/medical eval prior to release back home  pt denies any pain  reports anxiety but states that is normal  denies any ohter drug used than crack, admits to drinking one glass of wine       History provided by:  Patient   used: No      Patient is a 20-year-old female presenting to emergency department police  Patient was picked up by police while she was knocking on doors trying to get drugs  Patient has no complaints at this time  No headache  No neck pain  No chest pain  No shortness of breath  No nausea or vomiting  No abdominal pain  No back pain  No wounds  No fevers or chills  Patient states she used crack cocaine today  Snorted  Had 1 glass of wine  Awake alert and oriented x3  No slurring of speech  Normal balance  Denies thoughts of suicide or homicide  No hallucinations  MDM discharge home, patient not intoxicated clinically      None       Past Medical History:   Diagnosis Date    Alcohol abuse     [facility]ism ([facility] [address] )     Anxiety     Asthma     Heart problem     Murmur        Past Surgical History:   Procedure Laterality Date    NO PAST SURGERIES         History reviewed  No pertinent family history  I have reviewed and agree with the history as documented  Social History   Substance Use Topics    Smoking status: Current Every Day Smoker     Packs/day: 0 25     Types: Cigarettes    Smokeless tobacco: Never Used    Alcohol use Yes        Review of Systems   Constitutional: Negative for chills, diaphoresis and fever  Respiratory: Negative for cough, shortness of breath, wheezing and stridor  Cardiovascular: Negative for chest pain, palpitations and leg swelling     Gastrointestinal: Negative for abdominal pain, blood in stool, diarrhea, nausea and vomiting  Genitourinary: Negative for dysuria, frequency and urgency  Musculoskeletal: Negative for neck pain and neck stiffness  Skin: Negative for pallor and rash  Neurological: Negative for dizziness, syncope, weakness, light-headedness and headaches  All other systems reviewed and are negative  Physical Exam  Physical Exam   Constitutional: She is oriented to person, place, and time  She appears well-developed and well-nourished  HENT:   Head: Normocephalic and atraumatic  Eyes: Pupils are equal, round, and reactive to light  Neck: Neck supple  Cardiovascular: Normal rate, regular rhythm, normal heart sounds and intact distal pulses  Pulmonary/Chest: Effort normal and breath sounds normal  No respiratory distress  Abdominal: Soft  Bowel sounds are normal  There is no tenderness  Musculoskeletal: Normal range of motion  She exhibits no edema or tenderness  Neurological: She is alert and oriented to person, place, and time  No cranial nerve deficit or sensory deficit  She exhibits normal muscle tone  Coordination normal    Nonfocal neuro   Skin: Skin is warm and dry  Capillary refill takes less than 2 seconds  No rash noted  No erythema  Vitals reviewed        Vital Signs  ED Triage Vitals [05/28/18 0023]   Temperature Pulse Respirations Blood Pressure SpO2   97 8 °F (36 6 °C) 78 18 126/81 98 %      Temp Source Heart Rate Source Patient Position - Orthostatic VS BP Location FiO2 (%)   Oral Monitor Sitting Right arm --      Pain Score       No Pain           Vitals:    05/28/18 0023   BP: 126/81   Pulse: 78   Patient Position - Orthostatic VS: Sitting       Visual Acuity      ED Medications  Medications - No data to display    Diagnostic Studies  Results Reviewed     None                 No orders to display              Procedures  Procedures       Phone Contacts  ED Phone Contact    ED Course MDM  CritCare Time    Disposition  Final diagnoses:   Polysubstance abuse     Time reflects when diagnosis was documented in both MDM as applicable and the Disposition within this note     Time User Action Codes Description Comment    5/28/2018 12:36 AM Karla Mcleod [F52 10] Polysubstance abuse       ED Disposition     ED Disposition Condition Comment    Discharge  Vanessa Tavera discharge to home/self care  Condition at discharge: Good        Follow-up Information     Follow up With Specialties Details Why [address] Emergency Department Emergency Medicine  As needed, If symptoms worsen, chest pain, shortness of breath, lightheaded, vomiting or feel worse overall [address]  [phone] [facility], [address], [place], [place], [address]    [employer]   Please call to get a family doctor [phone]             Discharge Medication List as of 5/28/2018 12:37 AM      CONTINUE these medications which have NOT CHANGED    Details   ALPRAZolam ER (XANAX XR) 2 MG 24 hr tablet Take 2 mg by mouth every morning , Until Discontinued, Historical Med      amphetamine-dextroamphetamine (ADDERALL XR) 30 MG 24 hr capsule Take 30 mg by mouth every morning   , Until Discontinued, Historical Med      venlafaxine (EFFEXOR) 100 MG tablet Take 100 mg by mouth 2 (two) times a day , Until Discontinued, Historical Med           No discharge procedures on file      ED Provider  Electronically Signed by           Karie Marc MD  05/28/18 0140

## 2018-05-28 NOTE — DISCHARGE INSTRUCTIONS
Polysubstance Abuse   AMBULATORY CARE:   Polysubstance abuse  is the abuse of 2 or more drugs that cause impairment or distress  Substances include alcohol, nicotine, marijuana, cocaine, heroin, methamphetamine, hallucinogens such as mushrooms, or inhalants such as paint thinner  Prescribed medicines, such as opioids for pain or benzodiazepines for anxiety, are also commonly abused  Call 911 for any of the following:   · You feel you might harm yourself or others  Seek care immediately if:   · You have a seizure  · You have chest pain and your heart is beating faster than usual      · You have new shortness of breath  · You are dizzy and lightheaded  Contact your healthcare provider or therapist if:   · You are using drugs and think you are pregnant  · You have withdrawal symptoms and want to start using drugs again  · You have questions or concerns about your condition or care  Medicines:   · Withdrawal medicines  may be given according to the types of drugs you are abusing  Withdrawal from drugs can cause serious, life-threatening side effects  Certain medicines can help decrease your withdrawal symptoms and your desire for the drug  Ask for more information about the withdrawal medicines you may need  · Mood stabilizers  may be given to help prevent or treat depression or anxiety caused by drug abuse and withdrawal      · Take your medicine as directed  Contact your healthcare provider if you think your medicine is not helping or if you have side effects  Tell him or her if you are allergic to any medicine  Keep a list of the medicines, vitamins, and herbs you take  Include the amounts, and when and why you take them  Bring the list or the pill bottles to follow-up visits  Carry your medicine list with you in case of an emergency  Therapy and support  to help you stop using drugs:  · Cognitive and behavioral therapy  helps you change your thinking and behavior   It can help you develop plans to avoid the situations that make you want to use drugs  It also helps you cope with the feelings of wanting to use drugs  You may have individual or group therapy  · Contingency management  helps you set drug-free goals with a therapist  Krystin Mahmood will decide ways to celebrate your success when you reach a goal      · Family therapy and support groups  allow you and your family members to talk to and be encouraged by other people affected by drug abuse  You and your family members may attend together or separately  Ask your healthcare provider for information about programs in your area  How polysubstance abuse affects unborn or  babies:   · If you are pregnant or get pregnant while using drugs, you may have a miscarriage or give birth early  Your baby may be born addicted to the drugs  · Do not breastfeed your baby if you use drugs  Drugs pass from your bloodstream into your breast milk and affect your baby's health  Talk with your healthcare provider if you are using drugs and breastfeeding  Follow up with your healthcare provider as directed: You may be referred to a specialist to treat health conditions caused by your drug use  This includes mental health, heart, or lung specialists  Write down your questions so you remember to ask them during your visits  ©  [employer] [employer]  Information is for End User's use only and may not be sold, redistributed or otherwise used for commercial purposes  All illustrations and images included in CareNotes® are the copyrighted property of [employer]  or Chuy Guillermo  The above information is an  only  It is not intended as medical advice for individual conditions or treatments  Talk to your doctor, nurse or pharmacist before following any medical regimen to see if it is safe and effective for you

## 2018-05-29 LAB — PREGNANCY TEST URINE (HISTORICAL): NEGATIVE

## 2024-11-15 NOTE — PHYSICIAN ADVISOR
11/15/24 0600   Sleep Analysis   Hours Slept 7.5        Current patient class: Inpatient  The patient is currently on Hospital Day: 2      The patient was admitted to the hospital at 1012 on 5/9/18 for the following diagnosis:  Altered mental status [R41 82]  Acute alcohol intoxication ([facility] [address] ) [F10 929]       There is documentation in the medical record of an expected length of stay of at least 2 midnights  The patient is therefore expected to satisfy the 2 midnight benchmark and given the 2 midnight presumption is appropriate for INPATIENT ADMISSION  Given this expectation of a satisfying stay, CMS instructs us that the patient is most often appropriate for inpatient admission under part A provided medical necessity is documented in the chart  After review of the relevant documentation, labs, vital signs and test results, the patient is appropriate for INPATIENT ADMISSION  Admission to the hospital as an inpatient is a complex decision making process which requires the practitioner to consider the patients presenting complaint, history and physical examination and all relevant testing  With this in mind, in this case, the patient was deemed appropriate for INPATIENT ADMISSION  After review of the documentation and testing available at the time of the admission I concur with this clinical determination of medical necessity  Rationale is as follows: The patient is a 80 yrs old Female who presented to the ED at 5/8/2018  3:55 PM with a chief complaint of Overdose - Accidental (Pt found unconscious on a ramp, brought in by EMS, pt responds to painful stimuli, pt found with pipe next to her, pt snoring respirations, after IV attempt, pt uncooperative )     Patient was admitted to the hospital with altered mental status, secondary to polysubstance abuse  The patient at present continues to remain hospitalized with altered mental status, and will require further monitoring and management including IV benzodiazepines, IV fluids, and continued sedation  Patient is to be evaluated by Psychiatry, and will require further hospitalization continued acute care management  Patient is expected to satisfy the 2 midnight benchmark  The patients vitals on arrival were ED Triage Vitals   Temperature Pulse Respirations Blood Pressure SpO2   05/08/18 2034 05/08/18 1619 05/08/18 1612 05/08/18 1612 05/08/18 1612   98 2 °F (36 8 °C) (!) 120 (!) 26 158/96 98 %      Temp Source Heart Rate Source Patient Position - Orthostatic VS BP Location FiO2 (%)   05/08/18 2034 05/08/18 1619 05/08/18 1612 05/08/18 1612 --   Temporal Monitor Lying Right arm       Pain Score       05/08/18 1852       No Pain           History reviewed  No pertinent past medical history  History reviewed  No pertinent surgical history          Consults have been placed to:   IP CONSULT TO CASE MANAGEMENT    Vitals:    05/08/18 2106 05/08/18 2236 05/09/18 0739 05/09/18 2317   BP: 122/65 145/59 166/95 167/70   BP Location: Right arm Right arm Right arm Right arm   Pulse: 71 75 89 60   Resp: 16 16 20 18   Temp: 97 9 °F (36 6 °C) 99 °F (37 2 °C) 98 5 °F (36 9 °C) 97 6 °F (36 4 °C)   TempSrc: Temporal Temporal Temporal Temporal   SpO2: 99%  100% 98%   Weight:           Most recent labs:    Recent Labs      05/08/18   1644  05/08/18   1654  05/09/18   0643   WBC   --    --   7 71   HGB   --    --   13 4   HCT   --    --   40 4   PLT   --    --   410*   K  3 4*   --   3 9   NA  139   --   135*   CALCIUM  8 7   --   9 0   BUN  13   --   9   CREATININE  1 20   --   0 91   INR   --   0 98   --    TROPONINI  <0 02   --    --    AST  25   --   22   ALT  23   --   22   ALKPHOS  96   --   93   BILITOT  0 20   --   0 37       Scheduled Meds:  Current Facility-Administered Medications:  acetaminophen 650 mg Oral Q6H PRN Shelly Zaragoza MD    chlordiazePOXIDE 10 mg Oral Q8H [address] Pietro Myers MD    cloNIDine 0 1 mg Transdermal Weekly Pietro Myers MD    enoxaparin 40 mg Subcutaneous Daily Shelly Zaragoza MD haloperidol lactate 1 mg Intramuscular Q6H PRN Shelly Zaragoza MD    LORazepam 1 mg Intravenous Q6H PRN London Guardado MD    metoprolol 5 mg Intravenous Q6H PRN Shelly Zaragoza MD    ondansetron 4 mg Intravenous Q6H PRN Shelly Zaragoza MD    sodium chloride 100 mL/hr Intravenous Continuous Laverne Strauss PA-C Last Rate: 100 mL/hr (05/09/18 1743)     Continuous Infusions:  sodium chloride 100 mL/hr Last Rate: 100 mL/hr (05/09/18 1743)     PRN Meds:   acetaminophen    haloperidol lactate    LORazepam    metoprolol    ondansetron    Surgical procedures (if appropriate):